# Patient Record
Sex: FEMALE | Race: BLACK OR AFRICAN AMERICAN
[De-identification: names, ages, dates, MRNs, and addresses within clinical notes are randomized per-mention and may not be internally consistent; named-entity substitution may affect disease eponyms.]

---

## 2017-01-18 ENCOUNTER — HOSPITAL ENCOUNTER (OUTPATIENT)
Dept: HOSPITAL 47 - RADMAMWWP | Age: 67
Discharge: HOME | End: 2017-01-18
Payer: SELF-PAY

## 2017-01-18 DIAGNOSIS — Z12.31: Primary | ICD-10-CM

## 2017-01-18 PROCEDURE — 77063 BREAST TOMOSYNTHESIS BI: CPT

## 2017-01-20 NOTE — MM
Reason for exam: screening  (asymptomatic).

Last mammogram was performed 1 year and 2 months ago.



History:

Patient is postmenopausal.

Family history of breast cancer in mother at age 40 and breast cancer in sister at

age 48.

Benign excisional biopsy of the left breast, August 22, 2000.  Stereotactic core 

biopsy of the left breast, August 3, 2000.  Core biopsy of the left breast.



Physical Findings:

A clinical breast exam by your physician is recommended on an annual basis and 

results should be correlated with mammographic findings.



MG 3D Screening Mammo W/Cad

Bilateral CC and MLO view(s) were taken.

Prior study comparison: November 10, 2015, bilateral MG screening mammo w CAD.  

February 3, 2014, CAD bilateral diagnostic mammogram.  December 7, 2012, bilateral

digital screening mammo w/CAD.

The breast tissue is heterogeneously dense. This may lower the sensitivity of 

mammography.  No significant changes when compared with prior studies.





ASSESSMENT: Negative, BI-RAD 1



RECOMMENDATION:

Routine screening mammogram of both breasts in 1 year.

## 2018-10-24 ENCOUNTER — HOSPITAL ENCOUNTER (OUTPATIENT)
Dept: HOSPITAL 47 - RADMAMWWP | Age: 68
Discharge: HOME | End: 2018-10-24
Attending: INTERNAL MEDICINE
Payer: MEDICAID

## 2018-10-24 DIAGNOSIS — Z12.31: Primary | ICD-10-CM

## 2018-10-24 PROCEDURE — 77063 BREAST TOMOSYNTHESIS BI: CPT

## 2018-10-24 PROCEDURE — 77067 SCR MAMMO BI INCL CAD: CPT

## 2018-10-25 NOTE — MM
Reason for exam: screening  (asymptomatic).

Last mammogram was performed 1 year and 9 months ago.



History:

Patient is postmenopausal.

Family history of breast cancer in mother at age 40 and breast cancer in sister at

age 48.

Benign excisional biopsy of the left breast, August 22, 2000.  Stereotactic core 

biopsy of the left breast, August 3, 2000.  Core biopsy of the left breast.



Physical Findings:

A clinical breast exam by your physician is recommended on an annual basis and 

results should be correlated with mammographic findings.



MG 3D Screening Mammo W/Cad

Bilateral CC and MLO view(s) were taken.

Prior study comparison: January 18, 2017, bilateral MG 3d screening mammo w/cad.  

November 10, 2015, bilateral MG screening mammo w CAD.

The breast tissue is heterogeneously dense. This may lower the sensitivity of 

mammography.  No suspicious calcifications are seen. Post operative distortion 

left breast.  No significant changes when compared with prior studies.





ASSESSMENT: Benign, BI-RAD 2



RECOMMENDATION:

Routine screening mammogram of both breasts in 1 year.

## 2018-11-16 ENCOUNTER — HOSPITAL ENCOUNTER (OUTPATIENT)
Dept: HOSPITAL 47 - LABWHC1 | Age: 68
Discharge: HOME | End: 2018-11-16
Attending: INTERNAL MEDICINE
Payer: MEDICAID

## 2018-11-16 DIAGNOSIS — E55.9: ICD-10-CM

## 2018-11-16 DIAGNOSIS — E89.0: Primary | ICD-10-CM

## 2018-11-16 PROCEDURE — 82306 VITAMIN D 25 HYDROXY: CPT

## 2018-11-16 PROCEDURE — 36415 COLL VENOUS BLD VENIPUNCTURE: CPT

## 2018-11-16 PROCEDURE — 84443 ASSAY THYROID STIM HORMONE: CPT

## 2018-11-16 PROCEDURE — 84439 ASSAY OF FREE THYROXINE: CPT

## 2018-11-17 LAB — T4 FREE SERPL-MCNC: 1 NG/DL (ref 0.8–1.8)

## 2019-01-07 ENCOUNTER — HOSPITAL ENCOUNTER (OUTPATIENT)
Dept: HOSPITAL 47 - RADXRMAIN | Age: 69
Discharge: HOME | End: 2019-01-07
Attending: INTERNAL MEDICINE
Payer: MEDICAID

## 2019-01-07 DIAGNOSIS — M48.07: Primary | ICD-10-CM

## 2019-01-07 DIAGNOSIS — M46.96: ICD-10-CM

## 2019-01-07 PROCEDURE — 72100 X-RAY EXAM L-S SPINE 2/3 VWS: CPT

## 2019-01-07 NOTE — XR
EXAMINATION TYPE: XR lumbar spine 2 or 3V

 

DATE OF EXAM: 1/7/2019

 

CLINICAL HISTORY: Chronic low back pain

 

TECHNIQUE: Frontal and lateral images of the lumbar spine are obtained.

 

COMPARISON: None

 

FINDINGS:  There are 5 lumbar type vertebral bodies identified.  The lumbar spine shows satisfactory 
alignment without evidence of acute fracture or dislocation. Mild to moderate disc space narrowing L5
-S1 level is present. Vertebral body heights and disk space heights otherwise are within normal limit
s. There is multilevel facet arthropathy in the mid to lower lumbar spine. Vascular calyces overlying
 abdominal aorta is noted.

 

IMPRESSION: As above.

## 2019-01-26 ENCOUNTER — HOSPITAL ENCOUNTER (OUTPATIENT)
Dept: HOSPITAL 47 - RADMRIMAIN | Age: 69
End: 2019-01-26
Attending: ORTHOPAEDIC SURGERY
Payer: MEDICAID

## 2019-01-26 DIAGNOSIS — S83.242A: ICD-10-CM

## 2019-01-26 DIAGNOSIS — M94.262: Primary | ICD-10-CM

## 2019-01-26 NOTE — MR
EXAMINATION TYPE: MR knee LT wo con

 

DATE OF EXAM: 1/26/2019 8:51 AM

 

COMPARISON: NONE

 

HISTORY: Lt knee pain x 2 yrs, no trauma

 

TECHNIQUE: Multiplanar, multiecho imaging of the left knee is performed without IV contrast.

 

FINDINGS: There is only a small amount of joint fluid.

 

There is grade III to IV chondromalacia involving the lateral patellar facet. There is grade III to I
V chondromalacia involving the medial patellar facet. There is grade II to III chondromalacia involvi
ng the weightbearing surface of the medial femoral condyle. There is no significant chondromalacia in
volving the lateral femoral condyle.

 

There is an oblique tear through the posterior horn and body horn junction of the medial meniscus com
municating with the inferior articulating surface. This is undisplaced. The lateral meniscus is unrem
arkable.

 

Both the anterior and posterior cruciate ligaments are intact.

 

Both the medial and lateral collateral ligament complexes are intact. The iliotibial band inserts nor
soni upon Gerdy's tubercle.

 

Both the popliteus muscle and tendon are normal.

 

Both the quadriceps and patellar tendons are intact. There is minimal inflammation in the Hoffa fat s
pace.

 

IMPRESSION:  

1. CHONDROMALACIA AS DESCRIBED.

2. UNDISPLACED, OBLIQUE TEAR THROUGH THE POSTERIOR HORN AND BODY HORN JUNCTION OF THE MEDIAL MENISCUS
 COMMUNICATING WITH THE INFERIOR ARTICULATING SURFACE.

## 2019-07-01 ENCOUNTER — HOSPITAL ENCOUNTER (OUTPATIENT)
Dept: HOSPITAL 47 - LABWHC1 | Age: 69
Discharge: HOME | End: 2019-07-01
Attending: INTERNAL MEDICINE
Payer: MEDICAID

## 2019-07-01 DIAGNOSIS — E89.0: ICD-10-CM

## 2019-07-01 DIAGNOSIS — E55.9: Primary | ICD-10-CM

## 2019-07-01 PROCEDURE — 82306 VITAMIN D 25 HYDROXY: CPT

## 2019-07-01 PROCEDURE — 84443 ASSAY THYROID STIM HORMONE: CPT

## 2019-07-01 PROCEDURE — 36415 COLL VENOUS BLD VENIPUNCTURE: CPT

## 2019-07-01 PROCEDURE — 84439 ASSAY OF FREE THYROXINE: CPT

## 2019-07-02 LAB — T4 FREE SERPL-MCNC: 0.8 NG/DL (ref 0.8–1.8)

## 2019-07-19 ENCOUNTER — HOSPITAL ENCOUNTER (OUTPATIENT)
Dept: HOSPITAL 47 - RADBDWWP | Age: 69
Discharge: HOME | End: 2019-07-19
Attending: INTERNAL MEDICINE
Payer: MEDICAID

## 2019-07-19 DIAGNOSIS — Z13.820: Primary | ICD-10-CM

## 2019-07-19 PROCEDURE — 77080 DXA BONE DENSITY AXIAL: CPT

## 2019-07-19 NOTE — BD
EXAMINATION TYPE: Axial Bone Density

 

DATE OF EXAM: 7/19/2019

 

COMPARISON: NONE

 

CLINICAL HISTORY: Osteoporosis screening, postmenopausal female

 

Height:  5 FT 4 3/4 IN

Weight:  153

 

FRAX RISK QUESTIONS:

 

 

History of Fracture in Adulthood: YES

Secondary Osteoporosis:

    

    Current Tobacco Use: YES

 

RISK FACTORS 

HISTORY OF: 

Active: YES

Postmenopausal woman: AGE 48-49

 

MEDICATIONS: 

Thyroid Medications:  YES

Which medication: LEVOTHYROXINE

How Long: SINCE THE 80'S

Additional Medications: LEVOTHYROXINE, EYE DROPS FOR GLAUCOMA

 

 

Additional History:

 

 

EXAM MEASUREMENTS: 

Bone mineral densitometry was performed using the [employer] System.

Bone mineral density as measured about the Lumbar spine is:  

----- L1-L4(G/cm2): 1.616

T Score Values are as follows:

----- L2: 3.2

----- L3: 3.7

----- L4: 3.9

----- L1-L4: 3.6

Bone mineral density has: DECREASED  -10.9 % since study of: 2001

 

Bone mineral density about the R hip (g/cm2): 0.947

Bone mineral density about the L hip (g/cm2): 0.979

T Score values are as follows:

-----R Neck: -0.7

-----L Neck: -0.4

-----R Total: 0.5

-----L Total: 1.2

Bone mineral density has: DECREASED  -9.6 % since study of: 2001

 

 

IMPRESSION:

Normal (Values between +1 and -1 indicate normal bone mass).  Consider repeating this study in 5 year
s or sooner if there is some new clinical indication.

 

 

 

 

 

NOTE:  T-SCORE=SD OF THE YOUNG ADULT MEAN.

## 2020-02-26 ENCOUNTER — HOSPITAL ENCOUNTER (EMERGENCY)
Dept: HOSPITAL 47 - EC | Age: 70
Discharge: HOME | End: 2020-02-26
Payer: MEDICAID

## 2020-02-26 VITALS — TEMPERATURE: 98.1 F

## 2020-02-26 VITALS — HEART RATE: 57 BPM | DIASTOLIC BLOOD PRESSURE: 85 MMHG | SYSTOLIC BLOOD PRESSURE: 157 MMHG

## 2020-02-26 VITALS — RESPIRATION RATE: 20 BRPM

## 2020-02-26 DIAGNOSIS — M54.5: Primary | ICD-10-CM

## 2020-02-26 DIAGNOSIS — F17.200: ICD-10-CM

## 2020-02-26 DIAGNOSIS — R26.9: ICD-10-CM

## 2020-02-26 DIAGNOSIS — M47.816: ICD-10-CM

## 2020-02-26 PROCEDURE — 72100 X-RAY EXAM L-S SPINE 2/3 VWS: CPT

## 2020-02-26 PROCEDURE — 99283 EMERGENCY DEPT VISIT LOW MDM: CPT

## 2020-02-26 NOTE — ED
General Adult HPI





- General


Chief complaint: Back Pain/Injury


Stated complaint: Back pain


Time Seen by Provider: 20 07:51


Source: patient


Mode of arrival: ambulatory


Limitations: no limitations





- History of Present Illness


Initial comments: 


Dictation was produced using dragon dictation software. please excuse any 

grammatical, word or spelling errors. 





Chief Complaint: 69-year-old female presents with back pain.





History of Present Illness: 69-year-old female she presents today with back 

pain.  Patient was supposed to go to work hours called off because her back 

started hurting this morning.  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 

doing housekeeping.  Patient states she has pain that's to her left lateral soft

tissue and across her lower back.  Denies any radiation of symptoms.  He states 

that her pain is not severe and not apparent at rest only noticeable with 

certain movements.  She states with movement.  Denies any numbness and 

paresthesias to the lower extremities.  Denies any urinary or stool incontinence

or retention.  Denies any saddle anesthesia.  Patient denies any chronic steroid

use.  Denies any recent trauma.








The ROS documented in this emergency department record has been reviewed and 

confirmed by me.  Those systems with pertinent positive or negative responses 

have been documented in the HPI.  All other systems are other negative and/or 

noncontributory.








PHYSICAL EXAM:


General Impression: Alert and oriented x3, not in acute distress


HEENT: Normocephalic atraumatic, extra-ocular movements intact, pupils equal and

reactive to light bilaterally, mucous membranes moist.


Cardiovascular: Heart regular rate and rhythm, S1&S2 audible, no murmurs, rubs 

or gallops


Chest: Lungs clear to auscultation bilaterally, no rhonchi, no wheeze, no rales


Abdomen: Bowel sounds present, abdomen soft, non-tender, non-distended, no 

organomegaly


Musculoskeletal: Pulses present and equal in all extremities, no peripheral 

edema, tenderness to palpation over the left paraspinal muscular tissue


Motor:  no focal deficits noted


Neurological: CN II-XII grossly intact, no focal motor or sensory deficits 

noted, no saddle anesthesia, negative straight leg test


Skin: Intact with no visualized rashes


Psych: Normal affect and mood





ED course: 69-year-old female presents with clinical presentation consistent wit

h musculoskeletal back strain.  Upon arrival are within acceptable limits.  

Patient's well-appearing.  Patient ambulatory with minimal gait disturbance.  

Pain is reproducible at bedside.  Patient given 1 dose of Naprosyn, Lidoderm 

patch.  Patient was insistent upon getting an x-ray.  She also requests a work 

note.  She is advised to follow-up with primary care physician regarding 

outpatient management of back pain.  Patient is told to rest today.  X-ray shows

degenerative joint disease but no acute injuries.




















- Related Data


                                    Allergies











Allergy/AdvReac Type Severity Reaction Status Date / Time


 


No Known Allergies Allergy   Verified 20 07:45














Review of Systems


ROS Statement: 


Those systems with pertinent positive or pertinent negative responses have been 

documented in the HPI.





ROS Other: All systems not noted in ROS Statement are negative.





Past Medical History


Past Medical History: Thyroid Disorder


Additional Past Medical History / Comment(s): Glaucoma


History of Any Multi-Drug Resistant Organisms: None Reported


Past Surgical History: Appendectomy,  Section


Past Psychological History: No Psychological Hx Reported


Smoking Status: Current some day smoker


Past Alcohol Use History: Occasional


Past Drug Use History: None Reported





General Exam


Limitations: no limitations





Course


                                   Vital Signs











  20





  07:41


 


Temperature 98.1 F


 


Pulse Rate 63


 


Respiratory 16





Rate 


 


Blood Pressure 182/77


 


O2 Sat by Pulse 99





Oximetry 














Disposition


Clinical Impression: 


 Mechanical back pain





Disposition: HOME SELF-CARE


Condition: Good


Instructions (If sedation given, give patient instructions):  Acute Low Back 

Pain (ED)


Is patient prescribed a controlled substance at d/c from ED?: No


Referrals: 


Leah Starkey MD [Primary Care Provider] - 1-2 days


Time of Disposition: 09:22

## 2020-02-26 NOTE — XR
EXAMINATION TYPE: XR lumbar spine 2 or 3V

 

DATE OF EXAM: 2/26/2020

 

CLINICAL HISTORY: pain

 

TECHNIQUE: Three views of the lumbar spine are submitted.

 

COMPARISON: 1/7/2019

 

FINDINGS:  

There are 5 lumbar type vertebral bodies identified.  The lumbar spine shows satisfactory alignment w
ithout evidence of acute fracture or dislocation. Vertebral body heights are within normal limits.   
Mild curvature convex to the right. Mild multilevel degenerative disc space narrowing. Severe lower l
umbar facet joint arthropathy.  The overlying soft tissue appears unremarkable.

 

IMPRESSION:  

No acute fracture or dislocation is seen in the lumbar spine.

 

ICD 10 NO FRACTURE, INITIAL EVALUATION

## 2020-11-02 ENCOUNTER — HOSPITAL ENCOUNTER (OUTPATIENT)
Dept: HOSPITAL 47 - LABWHC1 | Age: 70
Discharge: HOME | End: 2020-11-02
Attending: INTERNAL MEDICINE
Payer: MEDICAID

## 2020-11-02 DIAGNOSIS — E89.0: Primary | ICD-10-CM

## 2020-11-02 PROCEDURE — 84439 ASSAY OF FREE THYROXINE: CPT

## 2020-11-02 PROCEDURE — 84443 ASSAY THYROID STIM HORMONE: CPT

## 2020-11-02 PROCEDURE — 36415 COLL VENOUS BLD VENIPUNCTURE: CPT

## 2020-11-03 LAB — T4 FREE SERPL-MCNC: 0.9 NG/DL (ref 0.8–1.8)

## 2020-12-03 ENCOUNTER — HOSPITAL ENCOUNTER (OUTPATIENT)
Dept: HOSPITAL 47 - RADMAMWWP | Age: 70
Discharge: HOME | End: 2020-12-03
Attending: INTERNAL MEDICINE
Payer: MEDICAID

## 2020-12-03 DIAGNOSIS — Z12.31: Primary | ICD-10-CM

## 2020-12-03 PROCEDURE — 77067 SCR MAMMO BI INCL CAD: CPT

## 2020-12-03 PROCEDURE — 77063 BREAST TOMOSYNTHESIS BI: CPT

## 2020-12-04 NOTE — MM
Reason for exam: screening  (asymptomatic).

Last mammogram was performed 2 years and 1 month ago.



History:

Patient is postmenopausal.

Family history of breast cancer in mother at age 40 and breast cancer in sister at

age 48.

Benign excisional biopsy of the left breast, August 22, 2000.  Stereotactic core 

biopsy of the left breast, August 3, 2000.  Core biopsy of the left breast.



Physical Findings:

A clinical breast exam by your physician is recommended on an annual basis and 

results should be correlated with mammographic findings.



MG 3D Screening Mammo W/Cad

Bilateral CC and MLO view(s) were taken.

Prior study comparison: October 24, 2018, bilateral MG 3d screening mammo w/cad.  

January 18, 2017, bilateral MG 3d screening mammo w/cad.

The breast tissue is heterogeneously dense. This may lower the sensitivity of 

mammography.  No significant changes when compared with prior studies.





ASSESSMENT: Benign, BI-RAD 2



RECOMMENDATION:

Routine screening mammogram of both breasts in 1 year.

## 2021-03-09 ENCOUNTER — HOSPITAL ENCOUNTER (OUTPATIENT)
Dept: HOSPITAL 47 - LABWHC1 | Age: 71
Discharge: HOME | End: 2021-03-09
Attending: INTERNAL MEDICINE
Payer: MEDICAID

## 2021-03-09 DIAGNOSIS — E89.0: Primary | ICD-10-CM

## 2021-03-09 PROCEDURE — 36415 COLL VENOUS BLD VENIPUNCTURE: CPT

## 2021-03-09 PROCEDURE — 84443 ASSAY THYROID STIM HORMONE: CPT

## 2021-03-09 PROCEDURE — 84439 ASSAY OF FREE THYROXINE: CPT

## 2021-03-10 LAB — T4 FREE SERPL-MCNC: 1.1 NG/DL (ref 0.8–1.8)

## 2021-03-18 ENCOUNTER — HOSPITAL ENCOUNTER (OUTPATIENT)
Dept: HOSPITAL 47 - RADXRMAIN | Age: 71
End: 2021-03-18
Attending: NURSE PRACTITIONER
Payer: MEDICAID

## 2021-03-18 DIAGNOSIS — R07.89: Primary | ICD-10-CM

## 2021-03-18 DIAGNOSIS — R06.02: ICD-10-CM

## 2021-03-18 PROCEDURE — 71046 X-RAY EXAM CHEST 2 VIEWS: CPT

## 2021-03-18 NOTE — XR
EXAMINATION TYPE: XR chest 2V

 

DATE OF EXAM: 3/18/2021

 

COMPARISON: Chest x-ray November 1, 2013

 

HISTORY: Chest tightness and shortness of breath for 5 months

 

TECHNIQUE:  Frontal and lateral views of the chest are obtained.

 

FINDINGS: Slightly elevated left hemidiaphragm. There is mild chronic parenchymal change without susp
icious new focal air space opacity, pleural effusion, or pneumothorax seen.  The cardiac silhouette s
ize is upper limits of normal with atherosclerotic change aortic knob.   The osseous structures are i
ntact.

 

IMPRESSION:  No acute process. No significant change from prior.

## 2021-04-09 ENCOUNTER — HOSPITAL ENCOUNTER (OUTPATIENT)
Dept: HOSPITAL 47 - RADUSWWP | Age: 71
Discharge: HOME | End: 2021-04-09
Attending: INTERNAL MEDICINE
Payer: MEDICAID

## 2021-04-09 DIAGNOSIS — Z78.0: Primary | ICD-10-CM

## 2021-04-09 DIAGNOSIS — Z80.3: ICD-10-CM

## 2021-04-09 PROCEDURE — 77061 BREAST TOMOSYNTHESIS UNI: CPT

## 2021-04-09 PROCEDURE — 77065 DX MAMMO INCL CAD UNI: CPT

## 2021-04-09 NOTE — MM
Reason for exam: follow-up at short interval from prior study.

Last mammogram was performed 4 months ago.



History:

Patient is postmenopausal.

Family history of breast cancer in mother at age 40 and breast cancer in sister at

age 48.

Benign excisional biopsy of the left breast, August 22, 2000.  Stereotactic core 

biopsy of the left breast, August 3, 2000.  Core biopsy of the left breast.



MG 3D Diag Mammo W/Cad LT

CC and MLO view(s) were taken of the left breast.

Prior study comparison: December 3, 2020, bilateral MG 3d screening mammo w/cad.  

October 24, 2018, bilateral MG 3d screening mammo w/cad.

The breast tissue is heterogeneously dense. This may lower the sensitivity of 

mammography.  Post excisional change superior left MLO view. Benign vascular 

calcifications.

No significant new findings when compared with previous films.



These results were verbally communicated with the patient and result sheet given 

to the patient on 4/9/21.





ASSESSMENT: Benign, BI-RAD 2



RECOMMENDATION:

Return to routine screening mammogram schedule for both breasts.

Back on schedule for December 2021.

Manage on a clinical basis with regard to any suspicious palpable area.

## 2021-04-09 NOTE — USB
Reason for exam: clinical finding.



History:

Patient is postmenopausal.

Family history of breast cancer in mother at age 40 and breast cancer in sister at

age 48.

Benign excisional biopsy of the left breast, August 22, 2000.  Stereotactic core 

biopsy of the left breast, August 3, 2000.  Core biopsy of the left breast.

Indicated problem(s): lump or thickening in the left breast.



Physical Findings:

Nurse did not find any significant physical abnormalities on exam.



US Breast Limited LT

Technologist: Kerry Webb

Left limited breast ultrasound including focal area of concern, retroareolar and 

axilla demonstrates a 1.3 x 0.5 x 0.5cm lymph node at the axilla, mildly thickened

(cortex 3.3mm) but nonenlarged node, likely reactive to covid vaccine. No other 

abnormality seen. 2 o'clock scar visualized. No cystic or solid lesion seen. 

Scanned 12-9 o'clock.



These results were verbally communicated with the patient and result sheet given 

to the patient on 4/9/10.





ASSESSMENT: Incomplete: need additional imaging evaluation, BI-RAD 0



RECOMMENDATION:

Special view mammogram of the left breast.

(3D)

## 2022-03-28 ENCOUNTER — HOSPITAL ENCOUNTER (OUTPATIENT)
Dept: HOSPITAL 47 - LABWHC1 | Age: 72
Discharge: HOME | End: 2022-03-28
Attending: INTERNAL MEDICINE
Payer: MEDICAID

## 2022-03-28 DIAGNOSIS — E03.9: Primary | ICD-10-CM

## 2022-03-28 DIAGNOSIS — E55.9: ICD-10-CM

## 2022-03-28 LAB — T4 FREE SERPL-MCNC: 1.15 NG/DL (ref 0.8–1.8)

## 2022-03-28 PROCEDURE — 82306 VITAMIN D 25 HYDROXY: CPT

## 2022-03-28 PROCEDURE — 84439 ASSAY OF FREE THYROXINE: CPT

## 2022-03-28 PROCEDURE — 84443 ASSAY THYROID STIM HORMONE: CPT

## 2022-03-28 PROCEDURE — 36415 COLL VENOUS BLD VENIPUNCTURE: CPT

## 2023-08-16 ENCOUNTER — HOSPITAL ENCOUNTER (OUTPATIENT)
Dept: HOSPITAL 47 - RADNMMAIN | Age: 73
Discharge: HOME | End: 2023-08-16
Attending: INTERNAL MEDICINE
Payer: MEDICARE

## 2023-08-16 DIAGNOSIS — I51.89: Primary | ICD-10-CM

## 2023-08-16 DIAGNOSIS — R07.9: ICD-10-CM

## 2023-08-16 PROCEDURE — 93017 CV STRESS TEST TRACING ONLY: CPT

## 2023-08-16 PROCEDURE — 78452 HT MUSCLE IMAGE SPECT MULT: CPT

## 2023-08-16 NOTE — CA
Lexiscan Nuclear Stress Test Report 

 

 Name:    Sabrina Easton 

 

 MRN:    [id number] 

 

 

 

 Exam Date: 2023 09:54 

 

 Exam Location:      [place]  

                     Stress 

 

 Ht (in):     65     Wt (lb):     120    BSA:    1.59 

 

 Ordering Phys:       Shekhar Hou MD 

 

 Referring Phys:      Cece Virk 

 

 Technologist:        PM,, 

 

 Age:    72    Gender:    F 

 

 :    1950 

 Procedure CPT: 

 

 Indications:              R07.9 

 

 ICD-10 Codes: 

 

 Patient History:          Chest pain 

 

 Medications: 

 

 Meds past 24 hrs: 

 

 Pretest Chest Pain: 

 

 STRESS TEST      Lexiscan 

 

 Protocol 

 

 

 

 

 Exercise Duration (min:sec):         02:00 

 Max ST Depressions (mm): 

 Angina Score: 

 Pollard Score: 

 Resting HR (bpm):      57 

 

 Peak HR (bpm):         92 

 

 Resting BP (mmHg):       156    /   77 

 

 Peak BP (mmHg):       129   /   66 

 

 MPHR:    149     Target HR:      127 

 

 % MPHR:     62 

 METS:  1.0 

 

 Total Dose: 

 Peak Dose: 

 Atropine: 

 Double Product:       [id number] 

 

 BP Response: 

 

 Stress Termination:       Infusion complete 

 

 Stress Symptoms: 

 No chest pain or symptoms 

 

 Stress Summary: 

 

 

  ECG ANALYSIS 

 

 Resting ECG:     Normal sinus rhythm with nonspecific ST-T wave  

                  changes 

 

 Stress ECG:      Patient was given intravenous Lexiscan as a  

                  protocol did not have chest pain or diagnostic ST  

                  segment depression 

 

 CONCLUSIONS 

 Negative stress test by EKG criteria 

 Cardial lead portion of the stress test will be reported  

 separately 

 

 Dr. Donato Morgan MD 

 (Electronically Signed) 

 Final Date:      2023 11:38

## 2023-08-16 NOTE — NM
EXAMINATION TYPE: NM stress lexiscan cardiolite

 

DATE OF EXAM: 8/16/2023

 

COMPARISON: NONE

 

HISTORY: Chest pain

 

TECHNIQUE:  After the intravenous administration of 9.7 mCi Tc 99m Sestamibi - Cardiolite resting SPE
CT images acquired 70 minutes post injection. 

 

At peak stress 25.1 mCi Tc 99m Sestamibi - Stress images obtained 35 minutes post injection 

 

The patient was stressed with 0.4mg Lexiscan. 

FINDINGS:  

 

No fixed defects are evident

No reversible stress defects on Spect images

 

Dyskinesia at the cardiac apex is present and may be some hypokinesia of the distal anterior wall.

Ejection fraction is calculated to be 62 %.

 

 

 

IMPRESSION:  

1. No stress-induced ischemic changes.

2. Some dyskinesia cardiac apex and mild hypokinesia of the distal anterior wall may be present.

## 2023-09-18 ENCOUNTER — HOSPITAL ENCOUNTER (OUTPATIENT)
Dept: HOSPITAL 47 - RADUSWWP | Age: 73
Discharge: HOME | End: 2023-09-18
Attending: INTERNAL MEDICINE
Payer: MEDICARE

## 2023-09-18 DIAGNOSIS — I73.9: Primary | ICD-10-CM

## 2023-09-18 PROCEDURE — 93922 UPR/L XTREMITY ART 2 LEVELS: CPT

## 2023-09-18 NOTE — US
EXAMINATION TYPE: US arterial LE single level

 

DATE OF EXAM: 9/18/2023 9:48 AM

 

CLINICAL INDICATION: Female, 73 years old with history of I73.9 PERIPHERAL VASCULAR DISEASE;

 

History of:

Smoker: Current Smoker 

Hypertension:  No

Diabetic:  No

Hyperlipidemia:  No

TIA/CVA:  No

Previous Vascular Surgery:  No

CAD:  No

MI:  No

Vascular Ulcers:  No

Claudication:  NO

Gangrene:  No

 

Multiphasic waveforms bilaterally. Monophasic waveforms within both images.

 

Right Brachial Pressure:  124

Left Brachial Pressure:  127

 

Ankle-Brachial Indices:

Right: 1.22

Left: 1.17

 

Toe Brachial Indices:

Right: 0.99

Left: 0.98

 

 

 

IMPRESSION:  Normal ankle-brachial indices bilaterally.

## 2025-06-09 ENCOUNTER — HOSPITAL ENCOUNTER (OUTPATIENT)
Dept: HOSPITAL 47 - RADUSWWP | Age: 75
Discharge: HOME | End: 2025-06-09
Attending: INTERNAL MEDICINE
Payer: MEDICARE

## 2025-06-09 DIAGNOSIS — F17.210: ICD-10-CM

## 2025-06-09 DIAGNOSIS — I73.9: Primary | ICD-10-CM

## 2025-06-09 PROCEDURE — 93922 UPR/L XTREMITY ART 2 LEVELS: CPT
